# Patient Record
Sex: FEMALE | Race: WHITE | Employment: UNEMPLOYED | RURAL
[De-identification: names, ages, dates, MRNs, and addresses within clinical notes are randomized per-mention and may not be internally consistent; named-entity substitution may affect disease eponyms.]

---
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
## 2025-03-12 ENCOUNTER — OFFICE VISIT (OUTPATIENT)
Dept: OBSTETRICS AND GYNECOLOGY | Facility: CLINIC | Age: 20
End: 2025-03-12
Payer: COMMERCIAL

## 2025-03-12 VITALS
HEIGHT: 64 IN | SYSTOLIC BLOOD PRESSURE: 120 MMHG | DIASTOLIC BLOOD PRESSURE: 80 MMHG | BODY MASS INDEX: 37.08 KG/M2 | WEIGHT: 217.2 LBS

## 2025-03-12 DIAGNOSIS — Z01.419 WELL WOMAN EXAM: Primary | ICD-10-CM

## 2025-03-12 DIAGNOSIS — Z97.5 IUD (INTRAUTERINE DEVICE) IN PLACE: ICD-10-CM

## 2025-03-12 DIAGNOSIS — T83.89XA HEAVY MENSES DUE TO IUD: ICD-10-CM

## 2025-03-12 DIAGNOSIS — N92.0 HEAVY MENSES DUE TO IUD: ICD-10-CM

## 2025-03-12 DIAGNOSIS — Z12.39 ENCOUNTER FOR BREAST CANCER SCREENING USING NON-MAMMOGRAM MODALITY: ICD-10-CM

## 2025-03-12 NOTE — PROGRESS NOTES
Annual Well Woman Visit    Subjective   Chief Complaint   Patient presents with    Gynecologic Exam     Pap done today. Patient has some concerns about her menstrual cycle.      Lucy Gandhi is a 19 y.o.  presenting to be seen for an annual well woman visit. She has a Paragard IUD in place and experiences heavy periods with large clots. She experiences cramping the week leading up to her period but not during it. She reports q 30 day cycles with 7 days of bleeding. Prior to having the Paragard, she had heavy cycles as well. She uses pads and changes q 2-3 hours. She denies history of STI. Denies abnormal discharge. Denies itching or burning. Denies pain with intercourse. Denies urinary concerns.     Reports she previously saw an OB/GYN in [place] for management of her heavy periods. She initially started Slynd but experienced worsening of her headaches, so the provider told her that she could only use non-hormonal IUD or spermicide for contraception. Her IUD insertion was very painful. She has since started nurtec and emgality for management of her chronic migraines.    OB Hx: G0  Contraception: Paragard IUD  Pap smear: never, completed today  Mammogram: N/A  Colonoscopy: N/A  DEXA Scan: N/A    Past Medical History:   Diagnosis Date    Anxiety     Migraines      Past Surgical History:   Procedure Laterality Date    TOENAIL EXCISION      WISDOM TOOTH EXTRACTION  2022     Family History   Problem Relation Age of Onset    Diabetes Paternal Grandfather     Diabetes Paternal Grandmother     Other (Bladder cancer) Maternal Great-Grandfather      Social History     Tobacco Use    Smoking status: Never    Smokeless tobacco: Never   Substance Use Topics    Alcohol use: Never    Drug use: Never     (Not in a hospital admission)    Patient has no known allergies.  Current Outpatient Medications on File Prior to Visit   Medication Sig Dispense Refill    Galcanezumab-gnlm (EMGALITY SC) Inject  under the  "skin into the appropriate area as directed.      rimegepant sulfate ODT (Nurtec) 75 MG disintegrating tablet Place 1 tablet under the tongue As Needed (Migraines).       No current facility-administered medications on file prior to visit.     Social History    Tobacco Use      Smoking status: Never      Smokeless tobacco: Never    Review of Systems  Pertinent items are noted in HPI, all other systems were reviewed and negative       Objective   /80   Ht 162.6 cm (64\")   Wt 98.5 kg (217 lb 3.2 oz)   LMP 02/13/2025 (Approximate)   BMI 37.28 kg/m²     Physical Exam:  General Appearance: alert, interactive, and cooperative  Breasts:   Examined in supine position  Symmetric without masses or skin dimpling  Nipples normal without inversion, lesions or discharge  There are no palpable axillary nodes  Abdomen: no masses, soft, non-tender, no guarding and no rebound tenderness  Pelvis:  Pelvic: Clinical staff was present for exam  External genitalia:  normal appearance of the external genitalia including Bartholin's and Lukachukai's glands  :  urethral meatus normal  Vagina:  normal pink mucosa without prolapse or lesions  Cervix:  normal appearance, IUD strings present  Uterus:  normal size, shape and consistency, non-tender  Adnexa:  normal bimanual exam of the adnexa, no masses or tenderness       Assessment & Plan    Annual well woman exam with age appropriate screening      Diagnosis Plan   1. Well woman exam  LIQUID-BASED PAP SMEAR WITH HPV GENOTYPING IF ASCUS ([facility])      2. Encounter for breast cancer screening using non-mammogram modality        3. IUD (intrauterine device) in place        4. Heavy menses due to IUD           Medications ordered: None    Procedures performed: Pap    - Mammogram: Not indicated   - Pap screening guidelines reviewed; pap smear completed today  - Healthy diet and exercise encouraged  - Contraception: Bernice. Discussed Lucy's medical history and contraception options. " Based on her history, estrogen is likely contraindicated, however progesterone may be better tolerated now that she is on migraine medications. Additionally, she would be a candidate to try progesterone IUD. She prefers not to go through IUD removal/ reinsertion currently. Discussed option for procedure under sedation. She will reach out if she desires alternative contraception.  - Screening: GC/TULIO on Pap    Follow up for annual visit or sooner with any concerns.    Rut Urbina MD  Obstetrics and Gynecology  [facility]

## 2025-03-13 LAB — REF LAB TEST METHOD: NORMAL
